# Patient Record
Sex: MALE | URBAN - METROPOLITAN AREA
[De-identification: names, ages, dates, MRNs, and addresses within clinical notes are randomized per-mention and may not be internally consistent; named-entity substitution may affect disease eponyms.]

---

## 2023-01-18 NOTE — PROGRESS NOTES
Sharlet Denver is a 32 y.o. male is seen on 2023 for Establish Care and Other (Request referral for hormone replacement, transitioning from male to female)    Assessment & Plan:     1. Hormone replacement therapy  Assessment & Plan:  Referral to endocrinology for management  Orders:  -     CBC WITH AUTOMATED DIFF; Future  -     METABOLIC PANEL, COMPREHENSIVE; Future  -     REFERRAL TO ENDOCRINOLOGY  2. Transsexualism  Assessment & Plan:  Referral to endocrinology for hormone replacement therapy  Orders:  -     CBC WITH AUTOMATED DIFF; Future  -     METABOLIC PANEL, COMPREHENSIVE; Future  -     REFERRAL TO ENDOCRINOLOGY  3. Recurrent major depressive disorder, in full remission [facility])  Assessment & Plan: In full remission  Orders:  -     CBC WITH AUTOMATED DIFF; Future  -     METABOLIC PANEL, COMPREHENSIVE; Future  -     REFERRAL TO ENDOCRINOLOGY  4. Screening for lipid disorders  -     LIPID PANEL; Future  5. Need for hepatitis C screening test  -     HEPATITIS C AB; Future  6. Encounter to establish care    Follow-up and Dispositions    Return in about 4 weeks (around 2023) for physical, lab results.        Subjective:     HPI    Previous PCP: unsure  Reason for switchin [place] Hx:  Occupation- unemployed  Household- boyfriend  Alcohol Use- rarely  Recreational Drug Use- none  Tobacco Use- none    Family Hx:  HTN- mother  Diabetes- none  HLD- mother  MI- none  Stroke- mother  Cancer- none  Mental Health Disorder- none  Autoimmune Disease - none    Preventive:  COVID-19 vac - received  Flu vaccine- none  Tetanus vac - none  Hepatitis C screening- ordered  MyChart activation - already activated    Medical History/Health Concerns:    Hormone replacement therapy/Transsexualism  Started: 2020  Treatment: Estradiol 4 mg BID, finasteride 5 mg daily, spironolactone 50 mg BID  Transitioning from male to female  Recently moved from [place]  Was prescribed current medications from clinic in [place]  Interested in gender reassignment surgery    Depression  Patient is seen for followup of depression. Ongoing symptoms include:  see PHQ-9  Current treatment: none  Followed by psychiatry: none  3 most recent PHQ Screens 1/19/2023   Little interest or pleasure in doing things Not at all   Feeling down, depressed, irritable, or hopeless Not at all   Total Score PHQ 2 0     Review of Systems   Constitutional:  Negative for chills, fever and malaise/fatigue. Respiratory:  Negative for shortness of breath. Cardiovascular:  Negative for chest pain. Objective:   /88 (BP 1 Location: Right upper arm, BP Patient Position: Sitting, BP Cuff Size: Large adult)   Pulse 86   Temp 98.5 °F (36.9 °C) (Oral)   Resp 20   Ht 5' 8\" (1.727 m)   Wt 212 lb 9.6 oz (96.4 kg)   SpO2 100%   BMI 32.33 kg/m²     Physical Exam  Vitals and nursing note reviewed. Constitutional:       Appearance: Normal appearance. HENT:      Head: Normocephalic and atraumatic. Cardiovascular:      Rate and Rhythm: Normal rate and regular rhythm. Heart sounds: No murmur heard. No gallop. Pulmonary:      Effort: Pulmonary effort is normal. No respiratory distress. Breath sounds: No wheezing, rhonchi or rales. Musculoskeletal:         General: Normal range of motion. Skin:     General: Skin is warm and dry. Neurological:      General: No focal deficit present. Mental Status: He is alert and oriented to person, place, and time. Psychiatric:         Mood and Affect: Mood normal.         Thought Content:  Thought content normal.         Judgment: Judgment normal.      HILARIO Conner

## 2023-01-19 ENCOUNTER — OFFICE VISIT (OUTPATIENT)
Dept: FAMILY MEDICINE CLINIC | Age: 28
End: 2023-01-19
Payer: MEDICAID

## 2023-01-19 VITALS
DIASTOLIC BLOOD PRESSURE: 88 MMHG | SYSTOLIC BLOOD PRESSURE: 123 MMHG | HEART RATE: 86 BPM | RESPIRATION RATE: 20 BRPM | OXYGEN SATURATION: 100 % | TEMPERATURE: 98.5 F | HEIGHT: 68 IN | BODY MASS INDEX: 32.22 KG/M2 | WEIGHT: 212.6 LBS

## 2023-01-19 DIAGNOSIS — Z13.220 SCREENING FOR LIPID DISORDERS: ICD-10-CM

## 2023-01-19 DIAGNOSIS — Z79.890 HORMONE REPLACEMENT THERAPY: Primary | ICD-10-CM

## 2023-01-19 DIAGNOSIS — F64.0 TRANSSEXUALISM: ICD-10-CM

## 2023-01-19 DIAGNOSIS — Z76.89 ENCOUNTER TO ESTABLISH CARE: ICD-10-CM

## 2023-01-19 DIAGNOSIS — Z11.59 NEED FOR HEPATITIS C SCREENING TEST: ICD-10-CM

## 2023-01-19 DIAGNOSIS — F33.42 RECURRENT MAJOR DEPRESSIVE DISORDER, IN FULL REMISSION (HCC): ICD-10-CM

## 2023-01-19 PROBLEM — G47.00 INSOMNIA: Status: ACTIVE | Noted: 2022-06-16

## 2023-01-19 PROBLEM — L63.9 ALOPECIA AREATA: Status: ACTIVE | Noted: 2022-06-16

## 2023-01-19 PROBLEM — F32.0 CURRENT MILD EPISODE OF MAJOR DEPRESSIVE DISORDER (HCC): Status: ACTIVE | Noted: 2022-06-16

## 2023-01-19 PROBLEM — F64.9 GENDER DYSPHORIA: Status: ACTIVE | Noted: 2022-06-16

## 2023-01-19 PROBLEM — Q51.0 CONGENITAL ABSENCE OF UTERUS: Status: ACTIVE | Noted: 2019-12-15

## 2023-01-19 PROCEDURE — 99203 OFFICE O/P NEW LOW 30 MIN: CPT

## 2023-01-19 RX ORDER — BIOTIN 10 MG
1 TABLET ORAL DAILY
COMMUNITY

## 2023-01-19 RX ORDER — LANOLIN ALCOHOL/MO/W.PET/CERES
3-10 CREAM (GRAM) TOPICAL
COMMUNITY

## 2023-01-19 RX ORDER — SPIRONOLACTONE 50 MG/1
100 TABLET, FILM COATED ORAL 2 TIMES DAILY
COMMUNITY
Start: 2022-09-30

## 2023-01-19 RX ORDER — FINASTERIDE 5 MG/1
5 TABLET, FILM COATED ORAL DAILY
COMMUNITY
Start: 2022-06-15

## 2023-01-19 RX ORDER — ESTRADIOL 2 MG/1
4 TABLET ORAL 2 TIMES DAILY
COMMUNITY
Start: 2020-11-19

## 2023-01-19 NOTE — PROGRESS NOTES
Alessandra Alee presents today for   Chief Complaint   Patient presents with    Establish Care    Other     Request referral for hormone replacement, transitioning from male to female       Alessandra Vickers preferred language for health care discussion is english/other. Is someone accompanying this pt? no    Is the patient using any DME equipment during [address]? no    Depression Screening:  3 most recent PHQ Screens 1/19/2023   Little interest or pleasure in doing things Not at all   Feeling down, depressed, irritable, or hopeless Not at all   Total Score PHQ 2 0       Learning Assessment:  Learning Assessment 1/19/2023   PRIMARY LEARNER Patient   PRIMARY LANGUAGE ENGLISH   LEARNER PREFERENCE PRIMARY DEMONSTRATION   ANSWERED BY patient   RELATIONSHIP SELF       Abuse Screening:  Abuse Screening Questionnaire 1/19/2023   Do you ever feel afraid of your partner? N   Are you in a relationship with someone who physically or mentally threatens you? N   Is it safe for you to go home? Y       Generalized Anxiety  No flowsheet data found. Health Maintenance Due   Topic Date Due    Hepatitis C Screening  Never done    Depression Screen  Never done    COVID-19 Vaccine (2 - Booster for Shaun series) 10/27/2021    Flu Vaccine (1) 08/01/2022   . Health Maintenance reviewed and discussed and ordered per Provider. Advance Directive:  1. Do you have an advance directive in place?  Patient Reply:no

## 2023-01-26 RX ORDER — FINASTERIDE 5 MG/1
5 TABLET, FILM COATED ORAL DAILY
Qty: 30 TABLET | Refills: 0 | Status: SHIPPED | OUTPATIENT
Start: 2023-01-26

## 2023-01-26 RX ORDER — ESTRADIOL 2 MG/1
4 TABLET ORAL 2 TIMES DAILY
Qty: 120 TABLET | Refills: 0 | Status: SHIPPED | OUTPATIENT
Start: 2023-01-26

## 2023-01-26 RX ORDER — SPIRONOLACTONE 50 MG/1
100 TABLET, FILM COATED ORAL 2 TIMES DAILY
Qty: 120 TABLET | Refills: 0 | Status: SHIPPED | OUTPATIENT
Start: 2023-01-26

## 2023-01-26 NOTE — TELEPHONE ENCOUNTER
Pt called he is very anxious about running out of these medications he does not have an appt yet w/endocrinology and he would like to know if you are willing to fill these meds till he can be seen. He only has 2days worth of pills left currently. Please advise  Requested Prescriptions     Pending Prescriptions Disp Refills    estradioL (ESTRACE) 2 mg tablet       Sig: Take 2 Tablets by mouth two (2) times a day. finasteride (PROSCAR) 5 mg tablet       Sig: Take 1 Tablet by mouth daily. spironolactone (ALDACTONE) 50 mg tablet       Sig: Take 2 Tablets by mouth two (2) times a day.

## 2023-02-01 ENCOUNTER — TELEPHONE (OUTPATIENT)
Dept: FAMILY MEDICINE CLINIC | Age: 28
End: 2023-02-01

## 2023-02-01 DIAGNOSIS — F64.0 TRANSSEXUALISM: Primary | ICD-10-CM

## 2023-02-01 DIAGNOSIS — Z79.890 HORMONE REPLACEMENT THERAPY: ICD-10-CM

## 2023-02-01 NOTE — TELEPHONE ENCOUNTER
Margarito Warner, [facility] do see patients with this dx. They do require a psych eval before scheduling appt.   Practice is limited to the amount of Medicaid patients they see per month, so they would like for us to to call them once he has had his psych eval

## 2023-02-03 ENCOUNTER — TELEPHONE (OUTPATIENT)
Dept: FAMILY MEDICINE CLINIC | Age: 28
End: 2023-02-03

## 2023-02-03 NOTE — TELEPHONE ENCOUNTER
Please let pt know I have ordered neuropsych referral for pt to be possibly seen at [facility].

## 2023-02-03 NOTE — TELEPHONE ENCOUNTER
Pt called today was very short tempered with me, MsJalen Nikki Waddell said okay to inform pt that we have been unable to found a physician that treats for his specific need and at this point he will be responsible for finding a dr. Michaela Enrique and if he needs a referral we will need specific dr. Name to send a referral for him. Pt also states that his spironolactone needed an authorization from insurance and he would like to know if that has been sent in. He also is concerned with the difficulty in finding physician that his meds will run out and wants to know if you will be willing to refill if the need arises.

## 2023-02-06 ENCOUNTER — TELEPHONE (OUTPATIENT)
Dept: FAMILY MEDICINE CLINIC | Age: 28
End: 2023-02-06

## 2023-02-06 NOTE — TELEPHONE ENCOUNTER
Patient called to inquire on status of prior authorization for spironalactone. He states he was previously told someone is working on this.

## 2023-02-07 NOTE — TELEPHONE ENCOUNTER
Received fax from [employer] stating Spironolactone 50 mg is approved from 2/6/23 through 2/6/24. [facility] stating his prescription was approved by insurance and he could call his pharmacy and ask them to run it through again.

## 2023-02-15 PROBLEM — Z00.00 ANNUAL PHYSICAL EXAM: Status: ACTIVE | Noted: 2023-02-15

## 2023-02-15 ASSESSMENT — ENCOUNTER SYMPTOMS
DIARRHEA: 0
COUGH: 0
SHORTNESS OF BREATH: 0
CHEST TIGHTNESS: 0
BLOOD IN STOOL: 0
VOMITING: 0
ABDOMINAL PAIN: 0
CONSTIPATION: 0
NAUSEA: 0

## 2023-02-15 NOTE — PROGRESS NOTES
Darvin Woodard is a 32 y.o. male is seen on 2/16/2023 for Annual Exam and Discuss Labs    Assessment & Plan:     1. Annual physical exam  Assessment & Plan:  Physical activity for a total of 150 minutes per week is recommended, drink plenty of water. Reduce CHO intake, such as white pastas, white rice, white breads. Avoid fried foods, and eat more green, leafy vegetables, whole grains, and lean proteins. Discussed recommended screenings and vaccines. 2. Hypertriglyceridemia  Assessment & Plan:  Advised pt on lifestyle modifications  Recheck cmp and lipid panel in 6 months  3. Chest pain, unspecified type  Assessment & Plan:  Check EKG to r/o cardiac etiology  Check TSH and T4 to r/o hyperthyroidism, d/t family history of goiter  Orders:  -     TSH; Future  -     T4, Free; Future  -     EKG 12 Lead, Inital; Future  4. Family history of goiter  Assessment & Plan:  Check TSH and T4 to r/o thyroid dysfunction  5. Primary insomnia  Assessment & Plan:  Start trazodone 50 mg nightly  If no improvement, may increase trazodone to 100 mg nightly  Orders:  -     traZODone (DESYREL) 50 MG tablet; Take 1 tablet by mouth nightly, Disp-90 tablet, R-1Normal  6. Hormone replacement therapy  Assessment & Plan:  Continue current regimen  Explained to pt, will manage only until pt establishes with specialist  Orders:  -     Amb External Referral To Psychiatry  7. Transsexualism  Assessment & Plan:  [facility], required for [facility] to [facility]  Pt states [facility] also [facility]  Orders:  -     Amb External Referral To Psychiatry  8. BMI 32.0-32.9,adult  Comments:  Advised pt on lifestyle modifications  9. Encounter to discuss test results    Follow-up and Dispositions    Return in about 4 weeks (around 3/16/2023) for chest pain, thyroid, insomnia, lab results.        Subjective:     HPI    Social Hx:  Occupation- unemployed  Household- boyfriend  Alcohol Use- rarely  Recreational Drug Use- none  Tobacco Use- none     Family Hx:  HTN- mother  Diabetes- none  HLD- mother  MI- none  Stroke- mother  Cancer- none  Mental Health Disorder- none  Autoimmune Disease - none    Preventive:  Diet: working on meal prepping, states medication makes him hungry  Exercise: does not exercise right now  Last eye exam- 10 years ago  Last dental exam- 7 years ago  COVID vaccine- received  Tdap vaccine- has received in 2012  Flu vaccine- recommended    Additional Health Concerns Addressed Today:    Hypertriglyceridemia  Treatment:  None  Comorbid: none  Diet: working on meal prepping, states medication makes him hungry  Exercise: does not exercise right now    Chest pain  Onset: last year, has not had recent episode  Symptoms: chest pain with palpitations  No shortness of breath  Unsure if related to possible thyroid disorder, mother has history of goiter    Family history of goiter  States his mother has history of goiter  Had episode of chest pain and palpitations last year, has not had recent episodes since then  States hair is dull, not shiny  Had patchy hair, at one point was wearing a wig, now taking finasteride  Requests to check for thyroid disorder    Insomnia  Onset: since high school  Unable to fall asleep  Taking melatonin, but wakes up groggy    Hormone replacement therapy/Transsexualism  Started: November 2020  Treatment: Estradiol 4 mg BID, finasteride 5 mg daily, spironolactone 50 mg BID  Transitioning from male to female  Recently moved from [place]  Was prescribed current medications from clinic in [place]  Interested in gender reassignment surgery  [facility]    Review of Systems   Constitutional:  Negative for chills and fatigue. Respiratory:  Negative for cough, chest tightness and shortness of breath. Cardiovascular:  Negative for chest pain, palpitations and leg swelling.    Gastrointestinal:  Negative for abdominal pain, blood in stool, constipation, diarrhea, nausea and vomiting. Endocrine: Negative for cold intolerance and heat intolerance. Genitourinary:  Negative for dysuria, frequency, hematuria and urgency. Musculoskeletal:  Negative for arthralgias, gait problem and joint swelling. Skin:  Negative for rash. Neurological:  Negative for dizziness, light-headedness and headaches. Psychiatric/Behavioral:  Negative for behavioral problems. Objective:     Vitals:    02/16/23 1314   BP: 124/87   Site: Left Upper Arm   Position: Sitting   Cuff Size: Large Adult   Pulse: 91   Resp: 20   Temp: 98 °F (36.7 °C)   TempSrc: Oral   SpO2: 99%   Weight: 214 lb 9.6 oz (97.3 kg)   Height: 5' 8\" (1.727 m)     Physical Exam  Constitutional:       General: He is not in acute distress. Appearance: He is not ill-appearing. HENT:      Head: Normocephalic and atraumatic. Right Ear: Tympanic membrane, ear canal and external ear normal.      Left Ear: Tympanic membrane, ear canal and external ear normal.      Mouth/Throat:      Mouth: Mucous membranes are moist.      Pharynx: Oropharynx is clear. Eyes:      Extraocular Movements: Extraocular movements intact. Pupils: Pupils are equal, round, and reactive to light. Cardiovascular:      Rate and Rhythm: Normal rate and regular rhythm. Pulmonary:      Effort: Pulmonary effort is normal. No respiratory distress. Breath sounds: No decreased breath sounds, wheezing, rhonchi or rales. Abdominal:      General: Bowel sounds are normal. There is no distension. Palpations: Abdomen is soft. There is no mass. Tenderness: There is no abdominal tenderness. There is no right CVA tenderness, left CVA tenderness, guarding or rebound. Musculoskeletal:      Cervical back: Normal range of motion and neck supple. Lymphadenopathy:      Cervical: No cervical adenopathy. Skin:     General: Skin is warm and dry.       Capillary Refill: Capillary refill takes less than 2 seconds. Neurological:      General: No focal deficit present. Mental Status: He is alert and oriented to person, place, and time. Psychiatric:         Mood and Affect: Mood normal.         Thought Content:  Thought content normal.         Judgment: Judgment normal.       Total time spent: 40 minutes    Nakul Lynn NP-DANIELLA

## 2023-02-15 NOTE — ASSESSMENT & PLAN NOTE
Physical activity for a total of 150 minutes per week is recommended, drink plenty of water. Reduce CHO intake, such as white pastas, white rice, white breads. Avoid fried foods, and eat more green, leafy vegetables, whole grains, and lean proteins. Discussed recommended screenings and vaccines.

## 2023-02-16 ENCOUNTER — OFFICE VISIT (OUTPATIENT)
Age: 28
End: 2023-02-16

## 2023-02-16 VITALS
OXYGEN SATURATION: 99 % | HEART RATE: 91 BPM | SYSTOLIC BLOOD PRESSURE: 124 MMHG | RESPIRATION RATE: 20 BRPM | DIASTOLIC BLOOD PRESSURE: 87 MMHG | HEIGHT: 68 IN | BODY MASS INDEX: 32.52 KG/M2 | TEMPERATURE: 98 F | WEIGHT: 214.6 LBS

## 2023-02-16 DIAGNOSIS — E78.1 HYPERTRIGLYCERIDEMIA: ICD-10-CM

## 2023-02-16 DIAGNOSIS — Z71.2 ENCOUNTER TO DISCUSS TEST RESULTS: ICD-10-CM

## 2023-02-16 DIAGNOSIS — Z79.890 HORMONE REPLACEMENT THERAPY: ICD-10-CM

## 2023-02-16 DIAGNOSIS — F64.0 TRANSSEXUALISM: ICD-10-CM

## 2023-02-16 DIAGNOSIS — R07.9 CHEST PAIN, UNSPECIFIED TYPE: ICD-10-CM

## 2023-02-16 DIAGNOSIS — F51.01 PRIMARY INSOMNIA: ICD-10-CM

## 2023-02-16 DIAGNOSIS — Z83.49 FAMILY HISTORY OF GOITER: ICD-10-CM

## 2023-02-16 DIAGNOSIS — Z00.00 ANNUAL PHYSICAL EXAM: Primary | ICD-10-CM

## 2023-02-16 RX ORDER — LANOLIN ALCOHOL/MO/W.PET/CERES
3-10 CREAM (GRAM) TOPICAL
COMMUNITY

## 2023-02-16 RX ORDER — TRAZODONE HYDROCHLORIDE 50 MG/1
50 TABLET ORAL NIGHTLY
Qty: 90 TABLET | Refills: 1 | Status: SHIPPED | OUTPATIENT
Start: 2023-02-16

## 2023-02-16 RX ORDER — BIOTIN 10 MG
1 TABLET ORAL DAILY
COMMUNITY

## 2023-02-16 RX ORDER — SPIRONOLACTONE 50 MG/1
2 TABLET, FILM COATED ORAL 2 TIMES DAILY
COMMUNITY
Start: 2023-02-07

## 2023-02-16 RX ORDER — ESTRADIOL 2 MG/1
2 TABLET ORAL 2 TIMES DAILY
COMMUNITY
Start: 2023-01-26

## 2023-02-16 RX ORDER — FINASTERIDE 5 MG/1
1 TABLET, FILM COATED ORAL DAILY
COMMUNITY
Start: 2023-01-26

## 2023-02-16 SDOH — ECONOMIC STABILITY: INCOME INSECURITY: HOW HARD IS IT FOR YOU TO PAY FOR THE VERY BASICS LIKE FOOD, HOUSING, MEDICAL CARE, AND HEATING?: SOMEWHAT HARD

## 2023-02-16 SDOH — ECONOMIC STABILITY: FOOD INSECURITY: WITHIN THE PAST 12 MONTHS, YOU WORRIED THAT YOUR FOOD WOULD RUN OUT BEFORE YOU GOT MONEY TO BUY MORE.: NEVER TRUE

## 2023-02-16 SDOH — ECONOMIC STABILITY: FOOD INSECURITY: WITHIN THE PAST 12 MONTHS, THE FOOD YOU BOUGHT JUST DIDN'T LAST AND YOU DIDN'T HAVE MONEY TO GET MORE.: NEVER TRUE

## 2023-02-16 SDOH — ECONOMIC STABILITY: HOUSING INSECURITY
IN THE LAST 12 MONTHS, WAS THERE A TIME WHEN YOU DID NOT HAVE A STEADY PLACE TO SLEEP OR SLEPT IN A SHELTER (INCLUDING NOW)?: NO

## 2023-02-16 ASSESSMENT — PATIENT HEALTH QUESTIONNAIRE - PHQ9
1. LITTLE INTEREST OR PLEASURE IN DOING THINGS: 0
SUM OF ALL RESPONSES TO PHQ QUESTIONS 1-9: 0
2. FEELING DOWN, DEPRESSED OR HOPELESS: 0
SUM OF ALL RESPONSES TO PHQ QUESTIONS 1-9: 0
SUM OF ALL RESPONSES TO PHQ9 QUESTIONS 1 & 2: 0

## 2023-02-16 NOTE — ASSESSMENT & PLAN NOTE
Referral to [facility] psychiatry for psychological eval, required for referral to [facility]  Pt states he also has appt [facility]

## 2023-02-16 NOTE — ASSESSMENT & PLAN NOTE
Check EKG to r/o cardiac etiology  Check TSH and T4 to r/o hyperthyroidism, d/t family history of goiter not applicable

## 2023-04-19 NOTE — PROGRESS NOTES
Mono Cordova presents today for   Chief Complaint   Patient presents with    Annual Exam    Discuss Labs       Mono Cordova preferred language for health care discussion is english/other. Is someone accompanying this pt? no    Is the patient using any DME equipment during OV? no    Depression Screening:  PHQ-9 Questionaire 2/16/2023 1/19/2023   Little interest or pleasure in doing things 0 0   Feeling down, depressed, or hopeless 0 0   PHQ-9 Total Score 0 0     PHQ Scores 2/16/2023 1/19/2023   PHQ2 Score 0 0   PHQ9 Score 0 0       Learning Assessment:  No question data found. Abuse Screening:  No flowsheet data found. Generalized Anxiety  No flowsheet data found. Health Maintenance Due   Topic Date Due    Varicella vaccine (1 of 2 - 2-dose childhood series) Never done    HIV screen  Never done    COVID-19 Vaccine (2 - Booster for Surekha series) 10/27/2021    Flu vaccine (1) 08/01/2022   . Health Maintenance reviewed and discussed and ordered per Provider. Mono Funeztina is updated on all     Coordination of Care:  1. Have you been to the ER, urgent care clinic since your last visit? Hospitalized since your last visit? no    2. Have you seen or consulted any other health care providers outside of the [address] [place] [address] since your last visit? Include any pap smears or colon screening.  no no

## 2024-03-16 ENCOUNTER — APPOINTMENT (OUTPATIENT)
Facility: HOSPITAL | Age: 29
End: 2024-03-16
Payer: MEDICAID

## 2024-03-16 ENCOUNTER — HOSPITAL ENCOUNTER (EMERGENCY)
Facility: HOSPITAL | Age: 29
Discharge: HOME | End: 2024-03-16
Payer: MEDICAID

## 2024-03-16 VITALS
RESPIRATION RATE: 18 BRPM | SYSTOLIC BLOOD PRESSURE: 110 MMHG | BODY MASS INDEX: 31.83 KG/M2 | TEMPERATURE: 98.6 F | HEART RATE: 96 BPM | HEIGHT: 68 IN | WEIGHT: 210 LBS | DIASTOLIC BLOOD PRESSURE: 80 MMHG | OXYGEN SATURATION: 100 %

## 2024-03-16 DIAGNOSIS — J03.90 ACUTE TONSILLITIS, UNSPECIFIED ETIOLOGY: Primary | ICD-10-CM

## 2024-03-16 LAB
ANION GAP SERPL CALC-SCNC: 5 MMOL/L (ref 3–18)
BASOPHILS # BLD: 0 K/UL (ref 0–0.1)
BASOPHILS NFR BLD: 0 % (ref 0–2)
BUN SERPL-MCNC: 10 MG/DL (ref 7–18)
BUN/CREAT SERPL: 12 (ref 12–20)
CALCIUM SERPL-MCNC: 9.8 MG/DL (ref 8.5–10.1)
CHLORIDE SERPL-SCNC: 102 MMOL/L (ref 100–111)
CO2 SERPL-SCNC: 27 MMOL/L (ref 21–32)
CREAT SERPL-MCNC: 0.82 MG/DL (ref 0.6–1.3)
DEPRECATED S PYO AG THROAT QL EIA: NEGATIVE
DIFFERENTIAL METHOD BLD: ABNORMAL
EOSINOPHIL # BLD: 0.1 K/UL (ref 0–0.4)
EOSINOPHIL NFR BLD: 1 % (ref 0–5)
ERYTHROCYTE [DISTWIDTH] IN BLOOD BY AUTOMATED COUNT: 11.6 % (ref 11.6–14.5)
GLUCOSE SERPL-MCNC: 113 MG/DL (ref 74–99)
HCT VFR BLD AUTO: 42.8 % (ref 36–48)
HGB BLD-MCNC: 14.5 G/DL (ref 13–16)
IMM GRANULOCYTES # BLD AUTO: 0.1 K/UL (ref 0–0.04)
IMM GRANULOCYTES NFR BLD AUTO: 0 % (ref 0–0.5)
LYMPHOCYTES # BLD: 2.7 K/UL (ref 0.9–3.6)
LYMPHOCYTES NFR BLD: 16 % (ref 21–52)
MCH RBC QN AUTO: 29.6 PG (ref 24–34)
MCHC RBC AUTO-ENTMCNC: 33.9 G/DL (ref 31–37)
MCV RBC AUTO: 87.3 FL (ref 78–100)
MONOCYTES # BLD: 0.9 K/UL (ref 0.05–1.2)
MONOCYTES NFR BLD: 5 % (ref 3–10)
NEUTS SEG # BLD: 13.2 K/UL (ref 1.8–8)
NEUTS SEG NFR BLD: 78 % (ref 40–73)
NRBC # BLD: 0 K/UL (ref 0–0.01)
NRBC BLD-RTO: 0 PER 100 WBC
PLATELET # BLD AUTO: 393 K/UL (ref 135–420)
PMV BLD AUTO: 8.9 FL (ref 9.2–11.8)
POTASSIUM SERPL-SCNC: 3.6 MMOL/L (ref 3.5–5.5)
RBC # BLD AUTO: 4.9 M/UL (ref 4.35–5.65)
SODIUM SERPL-SCNC: 134 MMOL/L (ref 136–145)
WBC # BLD AUTO: 17 K/UL (ref 4.6–13.2)

## 2024-03-16 PROCEDURE — 85025 COMPLETE CBC W/AUTO DIFF WBC: CPT

## 2024-03-16 PROCEDURE — 87880 STREP A ASSAY W/OPTIC: CPT

## 2024-03-16 PROCEDURE — 6360000002 HC RX W HCPCS: Performed by: HEALTH CARE PROVIDER

## 2024-03-16 PROCEDURE — 70491 CT SOFT TISSUE NECK W/DYE: CPT

## 2024-03-16 PROCEDURE — 99285 EMERGENCY DEPT VISIT HI MDM: CPT

## 2024-03-16 PROCEDURE — 80048 BASIC METABOLIC PNL TOTAL CA: CPT

## 2024-03-16 PROCEDURE — 96375 TX/PRO/DX INJ NEW DRUG ADDON: CPT

## 2024-03-16 PROCEDURE — 87070 CULTURE OTHR SPECIMN AEROBIC: CPT

## 2024-03-16 PROCEDURE — 96366 THER/PROPH/DIAG IV INF ADDON: CPT

## 2024-03-16 PROCEDURE — 6360000004 HC RX CONTRAST MEDICATION: Performed by: HEALTH CARE PROVIDER

## 2024-03-16 PROCEDURE — 96365 THER/PROPH/DIAG IV INF INIT: CPT

## 2024-03-16 RX ORDER — CLINDAMYCIN PHOSPHATE 900 MG/50ML
900 INJECTION, SOLUTION INTRAVENOUS
Status: COMPLETED | OUTPATIENT
Start: 2024-03-16 | End: 2024-03-16

## 2024-03-16 RX ORDER — CLINDAMYCIN HYDROCHLORIDE 300 MG/1
300 CAPSULE ORAL 3 TIMES DAILY
Qty: 30 CAPSULE | Refills: 0 | Status: SHIPPED | OUTPATIENT
Start: 2024-03-16 | End: 2024-03-26

## 2024-03-16 RX ORDER — METHYLPREDNISOLONE 4 MG/1
TABLET ORAL
Qty: 1 KIT | Refills: 0 | Status: SHIPPED | OUTPATIENT
Start: 2024-03-16 | End: 2024-03-22

## 2024-03-16 RX ORDER — DEXAMETHASONE SODIUM PHOSPHATE 4 MG/ML
10 INJECTION, SOLUTION INTRA-ARTICULAR; INTRALESIONAL; INTRAMUSCULAR; INTRAVENOUS; SOFT TISSUE ONCE
Status: COMPLETED | OUTPATIENT
Start: 2024-03-16 | End: 2024-03-16

## 2024-03-16 RX ADMIN — DEXAMETHASONE SODIUM PHOSPHATE 10 MG: 4 INJECTION INTRA-ARTICULAR; INTRALESIONAL; INTRAMUSCULAR; INTRAVENOUS; SOFT TISSUE at 16:08

## 2024-03-16 RX ADMIN — IOPAMIDOL 100 ML: 612 INJECTION, SOLUTION INTRAVENOUS at 17:52

## 2024-03-16 RX ADMIN — CLINDAMYCIN PHOSPHATE 900 MG: 900 INJECTION, SOLUTION INTRAVENOUS at 16:11

## 2024-03-16 ASSESSMENT — PAIN DESCRIPTION - LOCATION: LOCATION: THROAT

## 2024-03-16 ASSESSMENT — PAIN SCALES - GENERAL: PAINLEVEL_OUTOF10: 8

## 2024-03-16 ASSESSMENT — ENCOUNTER SYMPTOMS
CHEST TIGHTNESS: 0
WHEEZING: 0
CHOKING: 0
ABDOMINAL PAIN: 0
SINUS PRESSURE: 0
VOMITING: 0
SINUS PAIN: 0
VOICE CHANGE: 0
NAUSEA: 0
RHINORRHEA: 0
SHORTNESS OF BREATH: 0
APNEA: 0
FACIAL SWELLING: 0
TROUBLE SWALLOWING: 0
DIARRHEA: 0
STRIDOR: 0
COUGH: 0
SORE THROAT: 1

## 2024-03-16 ASSESSMENT — PAIN - FUNCTIONAL ASSESSMENT: PAIN_FUNCTIONAL_ASSESSMENT: 0-10

## 2024-03-16 ASSESSMENT — PAIN DESCRIPTION - ORIENTATION: ORIENTATION: RIGHT

## 2024-03-16 ASSESSMENT — PAIN DESCRIPTION - PAIN TYPE: TYPE: ACUTE PAIN

## 2024-03-16 ASSESSMENT — PAIN DESCRIPTION - FREQUENCY: FREQUENCY: INTERMITTENT

## 2024-03-16 NOTE — ED TRIAGE NOTES
Pt came ambulatory to triage c/o sore throat x 6days. Pt takes Ibuprofen and Naproxen alternately for pain.    Pt has root canal done on March 13th.    Denies fever, cough or chills.

## 2024-03-16 NOTE — ED PROVIDER NOTES
EMERGENCY DEPARTMENT HISTORY AND PHYSICAL EXAM        Date: 3/16/2024  Patient Name: Kahlil Ricardo    History of Presenting Illness     Chief Complaint   Patient presents with    Pharyngitis       History Provided By: History obtained from patient    HPI: Kahlil Ricardo, 28 y.o. male presents to the ED with cc of pharyngitis x 5 days    Patient states that she was started on amoxicillin 3 days ago by her dentist but her sore throat has continued to worsen over this time.  She states that she is able to eat and drink but has been having decreased oral intake over this time due to increased pain with swallowing.  She reports some mild fever that she took Tylenol for pain and fever this morning.    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    No nausea, vomiting, diarrhea, fever, chills, chest pain, shortness of breath, leg swelling     There are no other complaints, changes, or physical findings at this time.    Records Reviewed: none    PCP: Elinor Garcia NP-C    No current facility-administered medications on file prior to encounter.     Current Outpatient Medications on File Prior to Encounter   Medication Sig Dispense Refill    traZODone (DESYREL) 50 MG tablet Take 1 tablet by mouth nightly 90 tablet 1    Biotin 10 MG tablet Take 1 tablet by mouth daily      estradiol (ESTRACE) 2 MG tablet Take 2 tablets by mouth 2 times daily      finasteride (PROSCAR) 5 MG tablet Take 1 tablet by mouth daily      melatonin 3 MG TABS tablet 3-10 mg      spironolactone (ALDACTONE) 50 MG tablet Take 2 tablets by mouth in the morning and at bedtime             Past History     Past Medical History:  No past medical history on file.    Past Surgical History:  No past surgical history on file.    Family History:  Family History   Problem Relation Age of Onset    Hypertension Mother     Stroke Mother        Social History:  Social History     Tobacco Use    Smoking status: Never    Smokeless tobacco:

## 2024-03-17 LAB
BACTERIA SPEC CULT: NORMAL
SERVICE CMNT-IMP: NORMAL

## 2024-03-18 LAB
BACTERIA SPEC CULT: NORMAL
SERVICE CMNT-IMP: NORMAL

## 2024-03-19 LAB
BASOPHILS # BLD: 0 K/UL (ref 0–0.1)
BASOPHILS NFR BLD: 0 % (ref 0–2)
DIFFERENTIAL METHOD BLD: ABNORMAL
EOSINOPHIL # BLD: 0.1 K/UL (ref 0–0.4)
EOSINOPHIL NFR BLD: 1 % (ref 0–5)
ERYTHROCYTE [DISTWIDTH] IN BLOOD BY AUTOMATED COUNT: 11.6 % (ref 11.6–14.5)
HCT VFR BLD AUTO: 42.8 % (ref 35–45)
HGB BLD-MCNC: 14.5 G/DL (ref 12–16)
IMM GRANULOCYTES # BLD AUTO: 0.1 K/UL (ref 0–0.04)
IMM GRANULOCYTES NFR BLD AUTO: 0 % (ref 0–0.5)
LYMPHOCYTES # BLD: 2.7 K/UL (ref 0.9–3.6)
LYMPHOCYTES NFR BLD: 16 % (ref 21–52)
MCH RBC QN AUTO: 29.6 PG (ref 24–34)
MCHC RBC AUTO-ENTMCNC: 33.9 G/DL (ref 31–37)
MCV RBC AUTO: 87.3 FL (ref 78–100)
MONOCYTES # BLD: 0.9 K/UL (ref 0.05–1.2)
MONOCYTES NFR BLD: 5 % (ref 3–10)
NEUTS SEG # BLD: 13.2 K/UL (ref 1.8–8)
NEUTS SEG NFR BLD: 78 % (ref 40–73)
NRBC # BLD: 0 K/UL (ref 0–0.01)
NRBC BLD-RTO: 0 PER 100 WBC
PLATELET # BLD AUTO: 393 K/UL (ref 135–420)
PMV BLD AUTO: 8.9 FL (ref 9.2–11.8)
RBC # BLD AUTO: 4.9 M/UL (ref 4.2–5.3)
WBC # BLD AUTO: 17 K/UL (ref 4.6–13.2)